# Patient Record
Sex: FEMALE | Race: WHITE | Employment: OTHER | URBAN - METROPOLITAN AREA
[De-identification: names, ages, dates, MRNs, and addresses within clinical notes are randomized per-mention and may not be internally consistent; named-entity substitution may affect disease eponyms.]

---

## 2021-01-29 ENCOUNTER — IMMUNIZATION (OUTPATIENT)
Dept: PRIMARY CARE CLINIC | Age: 86
End: 2021-01-29
Payer: MEDICARE

## 2021-01-29 PROCEDURE — 0011A COVID-19, MODERNA VACCINE 100MCG/0.5ML DOSE: CPT | Performed by: PHYSICIAN ASSISTANT

## 2021-01-29 PROCEDURE — 91301 COVID-19, MODERNA VACCINE 100MCG/0.5ML DOSE: CPT | Performed by: PHYSICIAN ASSISTANT

## 2021-02-26 ENCOUNTER — IMMUNIZATION (OUTPATIENT)
Dept: PRIMARY CARE CLINIC | Age: 86
End: 2021-02-26
Payer: MEDICARE

## 2021-02-26 PROCEDURE — 91301 COVID-19, MODERNA VACCINE 100MCG/0.5ML DOSE: CPT | Performed by: PHYSICIAN ASSISTANT

## 2021-02-26 PROCEDURE — 0012A COVID-19, MODERNA VACCINE 100MCG/0.5ML DOSE: CPT | Performed by: PHYSICIAN ASSISTANT

## 2023-01-25 ENCOUNTER — APPOINTMENT (OUTPATIENT)
Dept: CT IMAGING | Age: 88
End: 2023-01-25
Payer: MEDICARE

## 2023-01-25 ENCOUNTER — APPOINTMENT (OUTPATIENT)
Dept: GENERAL RADIOLOGY | Age: 88
End: 2023-01-25
Payer: MEDICARE

## 2023-01-25 ENCOUNTER — HOSPITAL ENCOUNTER (INPATIENT)
Age: 88
LOS: 1 days | Discharge: OTHER INSTITUTION | End: 2023-01-26
Attending: EMERGENCY MEDICINE | Admitting: FAMILY MEDICINE
Payer: MEDICARE

## 2023-01-25 DIAGNOSIS — I62.9 INTRACRANIAL HEMORRHAGE (HCC): Primary | ICD-10-CM

## 2023-01-25 PROBLEM — I61.9 INTRACEREBRAL HEMORRHAGE, NONTRAUMATIC (HCC): Status: ACTIVE | Noted: 2023-01-25

## 2023-01-25 LAB
ALBUMIN SERPL-MCNC: 3.6 G/DL (ref 3.5–5.2)
ALP BLD-CCNC: 84 U/L (ref 35–104)
ALT SERPL-CCNC: 25 U/L (ref 0–32)
ANION GAP SERPL CALCULATED.3IONS-SCNC: 16 MMOL/L (ref 7–16)
AST SERPL-CCNC: 41 U/L (ref 0–31)
BASOPHILS ABSOLUTE: 0.06 E9/L (ref 0–0.2)
BASOPHILS RELATIVE PERCENT: 0.3 % (ref 0–2)
BILIRUB SERPL-MCNC: 0.5 MG/DL (ref 0–1.2)
BUN BLDV-MCNC: 11 MG/DL (ref 6–23)
CALCIUM SERPL-MCNC: 9 MG/DL (ref 8.6–10.2)
CHLORIDE BLD-SCNC: 99 MMOL/L (ref 98–107)
CO2: 18 MMOL/L (ref 22–29)
CREAT SERPL-MCNC: 0.6 MG/DL (ref 0.5–1)
EOSINOPHILS ABSOLUTE: 0.06 E9/L (ref 0.05–0.5)
EOSINOPHILS RELATIVE PERCENT: 0.3 % (ref 0–6)
GFR SERPL CREATININE-BSD FRML MDRD: >60 ML/MIN/1.73
GLUCOSE BLD-MCNC: 229 MG/DL (ref 74–99)
HCT VFR BLD CALC: 35 % (ref 34–48)
HEMOGLOBIN: 11.4 G/DL (ref 11.5–15.5)
IMMATURE GRANULOCYTES #: 0.09 E9/L
IMMATURE GRANULOCYTES %: 0.5 % (ref 0–5)
INR BLD: 1.3
LYMPHOCYTES ABSOLUTE: 2.49 E9/L (ref 1.5–4)
LYMPHOCYTES RELATIVE PERCENT: 14 % (ref 20–42)
MCH RBC QN AUTO: 29.4 PG (ref 26–35)
MCHC RBC AUTO-ENTMCNC: 32.6 % (ref 32–34.5)
MCV RBC AUTO: 90.2 FL (ref 80–99.9)
METER GLUCOSE: 202 MG/DL (ref 74–99)
MONOCYTES ABSOLUTE: 0.67 E9/L (ref 0.1–0.95)
MONOCYTES RELATIVE PERCENT: 3.8 % (ref 2–12)
NEUTROPHILS ABSOLUTE: 14.44 E9/L (ref 1.8–7.3)
NEUTROPHILS RELATIVE PERCENT: 81.1 % (ref 43–80)
PDW BLD-RTO: 15.1 FL (ref 11.5–15)
PLATELET # BLD: 206 E9/L (ref 130–450)
PMV BLD AUTO: 12.2 FL (ref 7–12)
POTASSIUM SERPL-SCNC: 3.1 MMOL/L (ref 3.5–5)
PROTHROMBIN TIME: 14.2 SEC (ref 9.3–12.4)
RBC # BLD: 3.88 E12/L (ref 3.5–5.5)
SODIUM BLD-SCNC: 133 MMOL/L (ref 132–146)
TOTAL PROTEIN: 6.2 G/DL (ref 6.4–8.3)
TROPONIN, HIGH SENSITIVITY: 40 NG/L (ref 0–9)
WBC # BLD: 17.8 E9/L (ref 4.5–11.5)

## 2023-01-25 PROCEDURE — 6360000004 HC RX CONTRAST MEDICATION: Performed by: RADIOLOGY

## 2023-01-25 PROCEDURE — 85610 PROTHROMBIN TIME: CPT

## 2023-01-25 PROCEDURE — 80053 COMPREHEN METABOLIC PANEL: CPT

## 2023-01-25 PROCEDURE — 99285 EMERGENCY DEPT VISIT HI MDM: CPT

## 2023-01-25 PROCEDURE — 70496 CT ANGIOGRAPHY HEAD: CPT

## 2023-01-25 PROCEDURE — 84484 ASSAY OF TROPONIN QUANT: CPT

## 2023-01-25 PROCEDURE — 71045 X-RAY EXAM CHEST 1 VIEW: CPT

## 2023-01-25 PROCEDURE — 1200000000 HC SEMI PRIVATE

## 2023-01-25 PROCEDURE — 71045 X-RAY EXAM CHEST 1 VIEW: CPT | Performed by: RADIOLOGY

## 2023-01-25 PROCEDURE — 82962 GLUCOSE BLOOD TEST: CPT

## 2023-01-25 PROCEDURE — 70450 CT HEAD/BRAIN W/O DYE: CPT

## 2023-01-25 PROCEDURE — 93005 ELECTROCARDIOGRAM TRACING: CPT | Performed by: EMERGENCY MEDICINE

## 2023-01-25 PROCEDURE — 85025 COMPLETE CBC W/AUTO DIFF WBC: CPT

## 2023-01-25 PROCEDURE — 2580000003 HC RX 258: Performed by: RADIOLOGY

## 2023-01-25 RX ORDER — SODIUM CHLORIDE 0.9 % (FLUSH) 0.9 %
10 SYRINGE (ML) INJECTION PRN
Status: COMPLETED | OUTPATIENT
Start: 2023-01-25 | End: 2023-01-25

## 2023-01-25 RX ADMIN — IOPAMIDOL 100 ML: 755 INJECTION, SOLUTION INTRAVENOUS at 20:45

## 2023-01-25 RX ADMIN — Medication 10 ML: at 20:45

## 2023-01-25 ASSESSMENT — PAIN - FUNCTIONAL ASSESSMENT: PAIN_FUNCTIONAL_ASSESSMENT: NONE - DENIES PAIN

## 2023-01-26 VITALS
BODY MASS INDEX: 20.73 KG/M2 | OXYGEN SATURATION: 93 % | WEIGHT: 105.6 LBS | RESPIRATION RATE: 17 BRPM | HEIGHT: 60 IN | DIASTOLIC BLOOD PRESSURE: 53 MMHG | SYSTOLIC BLOOD PRESSURE: 126 MMHG | TEMPERATURE: 96.4 F

## 2023-01-26 LAB
EKG ATRIAL RATE: 74 BPM
EKG P AXIS: 53 DEGREES
EKG P-R INTERVAL: 146 MS
EKG Q-T INTERVAL: 392 MS
EKG QRS DURATION: 88 MS
EKG QTC CALCULATION (BAZETT): 435 MS
EKG R AXIS: 43 DEGREES
EKG T AXIS: 59 DEGREES
EKG VENTRICULAR RATE: 74 BPM

## 2023-01-26 PROCEDURE — 2500000003 HC RX 250 WO HCPCS: Performed by: FAMILY MEDICINE

## 2023-01-26 PROCEDURE — 93010 ELECTROCARDIOGRAM REPORT: CPT | Performed by: INTERNAL MEDICINE

## 2023-01-26 PROCEDURE — 6360000002 HC RX W HCPCS: Performed by: FAMILY MEDICINE

## 2023-01-26 PROCEDURE — 51702 INSERT TEMP BLADDER CATH: CPT

## 2023-01-26 RX ORDER — GLYCOPYRROLATE 0.2 MG/ML
0.1 INJECTION INTRAMUSCULAR; INTRAVENOUS EVERY 4 HOURS PRN
Status: DISCONTINUED | OUTPATIENT
Start: 2023-01-26 | End: 2023-01-26

## 2023-01-26 RX ORDER — LORAZEPAM 2 MG/ML
0.5 INJECTION INTRAMUSCULAR
Status: DISCONTINUED | OUTPATIENT
Start: 2023-01-26 | End: 2023-01-26 | Stop reason: HOSPADM

## 2023-01-26 RX ORDER — ONDANSETRON 2 MG/ML
4 INJECTION INTRAMUSCULAR; INTRAVENOUS EVERY 6 HOURS PRN
Status: DISCONTINUED | OUTPATIENT
Start: 2023-01-26 | End: 2023-01-26 | Stop reason: HOSPADM

## 2023-01-26 RX ORDER — MORPHINE SULFATE 2 MG/ML
2 INJECTION, SOLUTION INTRAMUSCULAR; INTRAVENOUS
Status: DISCONTINUED | OUTPATIENT
Start: 2023-01-26 | End: 2023-01-26 | Stop reason: HOSPADM

## 2023-01-26 RX ORDER — GLYCOPYRROLATE 0.2 MG/ML
0.1 INJECTION INTRAMUSCULAR; INTRAVENOUS
Status: DISCONTINUED | OUTPATIENT
Start: 2023-01-26 | End: 2023-01-26 | Stop reason: HOSPADM

## 2023-01-26 RX ORDER — GLYCOPYRROLATE 0.2 MG/ML
0.1 INJECTION INTRAMUSCULAR; INTRAVENOUS EVERY 4 HOURS PRN
Status: DISCONTINUED | OUTPATIENT
Start: 2023-01-26 | End: 2023-01-26 | Stop reason: CLARIF

## 2023-01-26 RX ADMIN — GLYCOPYRROLATE 0.1 MG: 0.2 INJECTION, SOLUTION INTRAMUSCULAR; INTRAVENOUS at 11:07

## 2023-01-26 RX ADMIN — MORPHINE SULFATE 2 MG: 2 INJECTION, SOLUTION INTRAMUSCULAR; INTRAVENOUS at 12:27

## 2023-01-26 RX ADMIN — ONDANSETRON 4 MG: 2 INJECTION INTRAMUSCULAR; INTRAVENOUS at 14:35

## 2023-01-26 RX ADMIN — GLYCOPYRROLATE 0.1 MG: 0.2 INJECTION, SOLUTION INTRAMUSCULAR; INTRAVENOUS at 16:34

## 2023-01-26 RX ADMIN — MORPHINE SULFATE 2 MG: 2 INJECTION, SOLUTION INTRAMUSCULAR; INTRAVENOUS at 16:35

## 2023-01-26 RX ADMIN — GLYCOPYRROLATE 0.1 MG: 0.2 INJECTION, SOLUTION INTRAMUSCULAR; INTRAVENOUS at 14:57

## 2023-01-26 RX ADMIN — MORPHINE SULFATE 2 MG: 2 INJECTION, SOLUTION INTRAMUSCULAR; INTRAVENOUS at 14:35

## 2023-01-26 ASSESSMENT — LIFESTYLE VARIABLES: HOW OFTEN DO YOU HAVE A DRINK CONTAINING ALCOHOL: NEVER

## 2023-01-26 NOTE — PROGRESS NOTES
At patient bedside patient had not breath sounds or Heart rate. Two person heart rate and breath sound check. Patient has no heart rate or pulse and no respirations. Cat RN and Emilia Rileyre.

## 2023-01-26 NOTE — DISCHARGE SUMMARY
Discharge Summary    Date: 2023  Patient Name: Lorin Wu    YOB: 1935     Age: 80 y.o. Admit Date: 2023  Discharge Date: 2023  Discharge Condition:    Admission Diagnosis  Intracranial hemorrhage ([address] [I62.9]; Intracerebral hemorrhage, nontraumatic (HCC) [I61.9]      Discharge Diagnosis  Principal Problem:    Intracerebral hemorrhage, nontraumatic (HCC)  Resolved Problems:    * No resolved hospital problems. [facility] AND [facility] Stay  Narrative of Hospital Course:  Patient admitted with AMS  Found to have large CVA with midline shift  DNR-CC per   Patient passed    Consultants:  IP CONSULT TO INTERNAL MEDICINE  IP CONSULT TO HOSPICE    Surgeries/procedures Performed:      Treatments:            Discharge Plan/Disposition:      Hospital/Incidental Findings Requiring Follow Up:    Patient Instructions:    Diet:    Activity:  For number of days (if applicable): Other Instructions:    Provider Follow-Up:   No follow-ups on file.      Significant Diagnostic Studies:    Recent Labs:  Admission on 2023  WBC                                           Date: 2023  Value: 17.8 (A)    Ref range: 4.5 - 11.5 E9/L    Status: Final  RBC                                           Date: 2023  Value: 3.88        Ref range: 3.50 - 5.50 E12/L  Status: Final  Hemoglobin                                    Date: 2023  Value: 11.4 (A)    Ref range: 11.5 - 15.5 g/dL   Status: Final  Hematocrit                                    Date: 2023  Value: 35.0        Ref range: 34.0 - 48.0 %      Status: Final  MCV                                           Date: 2023  Value: 90.2        Ref range: 80.0 - 99.9 fL     Status: Final  MCH                                           Date: 2023  Value: 29.4        Ref range: 26.0 - 35.0 pg     Status: Final  MCHC                                          Date: 2023  Value: 32.6        Ref range: 32.0 - 34.5 % Status: Final  RDW                                           Date: 01/25/2023  Value: 15.1 (A)    Ref range: 11.5 - 15.0 fL     Status: Final  Platelets                                     Date: 01/25/2023  Value: 206         Ref range: 130 - 450 E9/L     Status: Final  MPV                                           Date: 01/25/2023  Value: 12.2 (A)    Ref range: 7.0 - 12.0 fL      Status: Final  Neutrophils %                                 Date: 01/25/2023  Value: 81.1 (A)    Ref range: 43.0 - 80.0 %      Status: Final  Immature Granulocytes %                       Date: 01/25/2023  Value: 0.5         Ref range: 0.0 - 5.0 %        Status: Final  Lymphocytes %                                 Date: 01/25/2023  Value: 14.0 (A)    Ref range: 20.0 - 42.0 %      Status: Final  Monocytes %                                   Date: 01/25/2023  Value: 3.8         Ref range: 2.0 - 12.0 %       Status: Final  Eosinophils %                                 Date: 01/25/2023  Value: 0.3         Ref range: 0.0 - 6.0 %        Status: Final  Basophils %                                   Date: 01/25/2023  Value: 0.3         Ref range: 0.0 - 2.0 %        Status: Final  Neutrophils Absolute                          Date: 01/25/2023  Value: 14.44 (A)   Ref range: 1.80 - 7.30 E9/L   Status: Final  Immature Granulocytes #                       Date: 01/25/2023  Value: 0.09        Ref range: E9/L               Status: Final  Lymphocytes Absolute                          Date: 01/25/2023  Value: 2.49        Ref range: 1.50 - 4.00 E9/L   Status: Final  Monocytes Absolute                            Date: 01/25/2023  Value: 0.67        Ref range: 0.10 - 0.95 E9/L   Status: Final  Eosinophils Absolute                          Date: 01/25/2023  Value: 0.06        Ref range: 0.05 - 0.50 E9/L   Status: Final  Basophils Absolute                            Date: 01/25/2023  Value: 0.06        Ref range: 0.00 - 0.20 E9/L   Status: Final  Sodium Date: 01/25/2023  Value: 133         Ref range: 132 - 146 mmol/L   Status: Final  Potassium                                     Date: 01/25/2023  Value: 3.1 (A)     Ref range: 3.5 - 5.0 mmol/L   Status: Final  Chloride                                      Date: 01/25/2023  Value: 99          Ref range: 98 - 107 mmol/L    Status: Final  CO2                                           Date: 01/25/2023  Value: 18 (A)      Ref range: 22 - 29 mmol/L     Status: Final  Anion Gap                                     Date: 01/25/2023  Value: 16          Ref range: 7 - 16 mmol/L      Status: Final  Glucose                                       Date: 01/25/2023  Value: 229 (A)     Ref range: 74 - 99 mg/dL      Status: Final  BUN                                           Date: 01/25/2023  Value: 11          Ref range: 6 - 23 mg/dL       Status: Final  Creatinine                                    Date: 01/25/2023  Value: 0.6         Ref range: 0.5 - 1.0 mg/dL    Status: Final  Est, Glom Filt Rate                           Date: 01/25/2023  Value: >60         Ref range: >=60 mL/min/1.73   Status: Final                Comment: Pediatric calculator link  [place].at. org/professionals/kdoqi/gfr_calculatorped  Effective Oct 3, 2022  These results are not intended for use in patients  <25years of age. eGFR results are calculated without  a race factor using the 2021 CKD-EPI equation. Careful  clinical correlation is recommended, particularly when  comparing to results calculated using previous equations. The CKD-EPI equation is less accurate in patients with  extremes of muscle mass, extra-renal metabolism of  creatinine, excessive creatinine ingestion, or following  therapy that affects renal tubular secretion.     Calcium                                       Date: 01/25/2023  Value: 9.0         Ref range: 8.6 - 10.2 mg/dL   Status: Final  Total Protein                                 Date: 01/25/2023  Value: 6.2 (A)     Ref range: 6.4 - 8.3 g/dL     Status: Final  Albumin                                       Date: 01/25/2023  Value: 3.6         Ref range: 3.5 - 5.2 g/dL     Status: Final  Total Bilirubin                               Date: 01/25/2023  Value: 0.5         Ref range: 0.0 - 1.2 mg/dL    Status: Final  Alkaline Phosphatase                          Date: 01/25/2023  Value: 84          Ref range: 35 - 104 U/L       Status: Final  ALT                                           Date: 01/25/2023  Value: 25          Ref range: 0 - 32 U/L         Status: Final  AST                                           Date: 01/25/2023  Value: 41 (A)      Ref range: 0 - 31 U/L         Status: Final  Troponin, High Sensitivity                    Date: 01/25/2023  Value: 40 (A)      Ref range: 0 - 9 ng/L         Status: Final                Comment: High Sensitivity Troponin values cannot be compared with  other Troponin methodologies. Patients with high levels of Biotin oral intake (i.e. >5 mg/day)  may have falsely decreased Troponin levels. Samples collected  within 8 hours of biotin intake may require additional information  for diagnosis.     Ventricular Rate                              Date: 01/25/2023  Value: 74          Ref range: BPM                Status: Final  Atrial Rate                                   Date: 01/25/2023  Value: 74          Ref range: BPM                Status: Final  P-R Interval                                  Date: 01/25/2023  Value: 146         Ref range: ms                 Status: Final  QRS Duration                                  Date: 01/25/2023  Value: 88          Ref range: ms                 Status: Final  Q-T Interval                                  Date: 01/25/2023  Value: 392         Ref range: ms                 Status: Final  QTc Calculation (Bazett)                      Date: 01/25/2023  Value: 435         Ref range: ms                 Status: Final  P Axis Date: 01/25/2023  Value: 53          Ref range: degrees            Status: Final  R Axis                                        Date: 01/25/2023  Value: 43          Ref range: degrees            Status: Final  T Axis                                        Date: 01/25/2023  Value: 59          Ref range: degrees            Status: Final  Protime                                       Date: 01/25/2023  Value: 14.2 (A)    Ref range: 9.3 - 12.4 sec     Status: Final  INR                                           Date: 01/25/2023  Value: 1.3           Status: Final  Meter Glucose                                 Date: 01/25/2023  Value: 202 (A)     Ref range: 74 - 99 mg/dL      Status: Final  ------------    Radiology last 7 days:  CT HEAD WO CONTRAST    Result Date: 1/25/2023  CT Head: Multicompartmental hemorrhages as described above, including large right frontal intraparenchymal hematoma. 2.2 cm of right to left midline shift. Right to left uncal herniation. Mild dilatation of the atrium and temporal horn of the left lateral ventricle, compatible with early changes of entrapment. CTA Neck: No hemodynamically significant stenosis or dissection of the cervical internal carotid arteries. Irregular contour of the bilateral cervical ICA is in a region of artifact, and therefore may be artifactual however fibromuscular dysplasia is not excluded. No high-grade stenosis or dissection of the cervical vertebral arteries. CTA Head: No large vessel arterial occlusion or high-grade stenosis. Nonemergent incidental findings as above. Critical results were called by Dr. Radha Castro MD to Dr. Lev Dubose on 1/25/2023 at 9:33 p.m. [place] time. XR CHEST PORTABLE    Result Date: 1/25/2023  Bilateral lung hyperinflation. No acute process.      CTA HEAD W CONTRAST    Result Date: 1/25/2023  CT Head: Multicompartmental hemorrhages as described above, including large right frontal intraparenchymal hematoma. 2.2 cm of right to left midline shift. Right to left uncal herniation. Mild dilatation of the atrium and temporal horn of the left lateral ventricle, compatible with early changes of entrapment. CTA Neck: No hemodynamically significant stenosis or dissection of the cervical internal carotid arteries. Irregular contour of the bilateral cervical ICA is in a region of artifact, and therefore may be artifactual however fibromuscular dysplasia is not excluded. No high-grade stenosis or dissection of the cervical vertebral arteries. CTA Head: No large vessel arterial occlusion or high-grade stenosis. Nonemergent incidental findings as above. Critical results were called by Dr. Denilson Gonzalez MD to Dr. Snow Ramesh on 1/25/2023 at 9:33 p.m. [place] time. [unfilled]    Discharge Medications    There are no discharge medications for this patient. There are no discharge medications for this patient. There are no discharge medications for this patient. There are no discharge medications for this patient. Time Spent on Discharge:  15 minutes were spent in patient examination, evaluation, counseling as well as medication reconciliation, prescriptions for required medications, discharge plan, and follow up.     Electronically signed by Jeffrey Valdes MD on 1/26/23 at 6:05 PM EST

## 2023-01-26 NOTE — CARE COORDINATION
1/26/2023 social work transition of care planning  Hospice consult noted on chart. Sw spoke with pt's spouse at [address] provided. [facility] chosen,referral made.   Electronically signed by SALLIE Rodriguez on 1/26/2023 at 8:19 AM

## 2023-01-26 NOTE — ED NOTES
Returned to room. Family at bedside. Dr. Nadine Pope discussing CT results/POC.      Olegario Conway, RN  01/25/23 1900

## 2023-01-26 NOTE — ED PROVIDER NOTES
HPI:  1/25/23, Time: 8:41 PM EST Rudy Oppenheim is a 80 y.o. female presenting to the ED for altered mental status, beginning since 725 pmago. The complaint has been persistent, severe in severity, and worsened by nothing. Patient was brought in by EMS because of unresponsiveness patient reportedly was having strokelike symptoms at home. Patient unable to give history    ROS:   Pertinent positives and negatives are stated within HPI, all other systems reviewed and are negative.  --------------------------------------------- PAST HISTORY ---------------------------------------------  Past Medical History:  has no past medical history on file. Past Surgical History:  has no past surgical history on file. Social History:      Family History: family history is not on file. The patients home medications have been reviewed. Allergies: Patient has no known allergies. ---------------------------------------------------PHYSICAL EXAM--------------------------------------    Constitutional/General: Unresponsive  Head: Normocephalic and atraumatic  Eyes: Right pupil is about 3 to 4 mm nonreactive left pupil 2 mm nonreactive  Mouth: Oropharynx no gag  Neck: Supple, full ROM, non tender to palpation in the midline, no stridor, no crepitus, no meningeal signs  Pulmonary: Lungs diminished bilateral  Cardiovascular:  Regular rate. Regular rhythm. No murmurs, gallops, or rubs. 2+ distal pulses  Chest: no chest wall tenderness  Abdomen: Soft. Non tender. Non distended. +BS. No rebound, guarding, or rigidity. No pulsatile masses appreciated. Musculoskeletal: Patient does withdraw to pain upper extremity  Skin: warm and dry. No rashes. Neurologic: Patient unresponsive.   Patient nonverbal.  Patient withdraws the pain    -------------------------------------------------- RESULTS -------------------------------------------------  I have personally reviewed all laboratory and imaging results for this patient. Results are listed below.      LABS:  Results for orders placed or performed during the hospital encounter of 01/25/23   CBC with Auto Differential   Result Value Ref Range    WBC 17.8 (H) 4.5 - 11.5 E9/L    RBC 3.88 3.50 - 5.50 E12/L    Hemoglobin 11.4 (L) 11.5 - 15.5 g/dL    Hematocrit 35.0 34.0 - 48.0 %    MCV 90.2 80.0 - 99.9 fL    MCH 29.4 26.0 - 35.0 pg    MCHC 32.6 32.0 - 34.5 %    RDW 15.1 (H) 11.5 - 15.0 fL    Platelets 659 312 - 631 E9/L    MPV 12.2 (H) 7.0 - 12.0 fL    Neutrophils % 81.1 (H) 43.0 - 80.0 %    Immature Granulocytes % 0.5 0.0 - 5.0 %    Lymphocytes % 14.0 (L) 20.0 - 42.0 %    Monocytes % 3.8 2.0 - 12.0 %    Eosinophils % 0.3 0.0 - 6.0 %    Basophils % 0.3 0.0 - 2.0 %    Neutrophils Absolute 14.44 (H) 1.80 - 7.30 E9/L    Immature Granulocytes # 0.09 E9/L    Lymphocytes Absolute 2.49 1.50 - 4.00 E9/L    Monocytes Absolute 0.67 0.10 - 0.95 E9/L    Eosinophils Absolute 0.06 0.05 - 0.50 E9/L    Basophils Absolute 0.06 0.00 - 0.20 E9/L   Comprehensive Metabolic Panel   Result Value Ref Range    Sodium 133 132 - 146 mmol/L    Potassium 3.1 (L) 3.5 - 5.0 mmol/L    Chloride 99 98 - 107 mmol/L    CO2 18 (L) 22 - 29 mmol/L    Anion Gap 16 7 - 16 mmol/L    Glucose 229 (H) 74 - 99 mg/dL    BUN 11 6 - 23 mg/dL    Creatinine 0.6 0.5 - 1.0 mg/dL    Est, Glom Filt Rate >60 >=60 mL/min/1.73    Calcium 9.0 8.6 - 10.2 mg/dL    Total Protein 6.2 (L) 6.4 - 8.3 g/dL    Albumin 3.6 3.5 - 5.2 g/dL    Total Bilirubin 0.5 0.0 - 1.2 mg/dL    Alkaline Phosphatase 84 35 - 104 U/L    ALT 25 0 - 32 U/L    AST 41 (H) 0 - 31 U/L   Troponin   Result Value Ref Range    Troponin, High Sensitivity 40 (H) 0 - 9 ng/L   Protime-INR   Result Value Ref Range    Protime 14.2 (H) 9.3 - 12.4 sec    INR 1.3    POCT Glucose   Result Value Ref Range    Meter Glucose 202 (H) 74 - 99 mg/dL   EKG 12 Lead   Result Value Ref Range    Ventricular Rate 74 BPM    Atrial Rate 74 BPM    P-R Interval 146 ms    QRS Duration 88 ms    Q-T Interval 392 ms    QTc Calculation (Bazett) 435 ms    P Axis 53 degrees    R Axis 43 degrees    T Axis 59 degrees       RADIOLOGY:  Interpreted by Radiologist.  XR CHEST PORTABLE   Final Result   Bilateral lung hyperinflation. No acute process. CT HEAD WO CONTRAST   Final Result   CT Head:      Multicompartmental hemorrhages as described above, including large right   frontal intraparenchymal hematoma. 2.2 cm of right to left midline shift. Right to left uncal herniation. Mild dilatation of the atrium and temporal horn of the left lateral   ventricle, compatible with early changes of entrapment. CTA Neck:      No hemodynamically significant stenosis or dissection of the cervical   internal carotid arteries. Irregular contour of the bilateral cervical ICA   is in a region of artifact, and therefore may be artifactual however   fibromuscular dysplasia is not excluded. No high-grade stenosis or dissection of the cervical vertebral arteries. CTA Head:      No large vessel arterial occlusion or high-grade stenosis. Nonemergent incidental findings as above. Critical results were called by Dr. Sunshine Cullen MD to Dr. Julia Le on   1/25/2023 at 9:33 p.m. [place] time. CTA HEAD W CONTRAST   Final Result   CT Head:      Multicompartmental hemorrhages as described above, including large right   frontal intraparenchymal hematoma. 2.2 cm of right to left midline shift. Right to left uncal herniation. Mild dilatation of the atrium and temporal horn of the left lateral   ventricle, compatible with early changes of entrapment. CTA Neck:      No hemodynamically significant stenosis or dissection of the cervical   internal carotid arteries. Irregular contour of the bilateral cervical ICA   is in a region of artifact, and therefore may be artifactual however   fibromuscular dysplasia is not excluded.       No high-grade stenosis or dissection of the cervical vertebral arteries. CTA Head:      No large vessel arterial occlusion or high-grade stenosis. Nonemergent incidental findings as above. Critical results were called by Dr. Debo Garcia MD to Dr. Lexi Kay on   1/25/2023 at 9:33 p.m. [facility] time. EKG:  This EKG is signed and interpreted by me. Rate: 74  Rhythm: Sinus  Interpretation: non-specific EKG  Comparison: no previous EKG available        ------------------------- NURSING NOTES AND VITALS REVIEWED ---------------------------   The nursing notes within the ED encounter and vital signs as below have been reviewed by myself. BP (!) 191/87   Pulse 71   Temp 97.6 °F (36.4 °C) (Axillary)   Resp 22   Ht 5' (1.524 m)   Wt 100 lb (45.4 kg)   LMP  (Approximate)   SpO2 98%   BMI 19.53 kg/m²   Oxygen Saturation Interpretation: Normal    The patients available past medical records and past encounters were reviewed. ------------------------------ ED COURSE/MEDICAL DECISION MAKING----------------------  Medications   iopamidol (ISOVUE-370) 76 % injection 100 mL (100 mLs IntraVENous Given 1/25/23 2045)   sodium chloride flush 0.9 % injection 10 mL (10 mLs IntraVENous Given 1/25/23 2045)             Medical Decision Making:      History From: Patient presenting here from home because of unresponsiveness. Patient unable to give history. Patient was last known well around 7:00. Per family patient was complaining of headache. Patient unable to give history here. CC/HPI Summary, DDx, ED Course, Reassessment, Tests Considered, Patient expectation:   Patient presenting here history of hypothyroidism as well as history of hyperlipidemia presenting here because of altered mental status from home. Patient was last known well in around 7 PM.  Patient arrived here she was not responding. Patient heart exam normal lungs are clear abdomen soft patient has no upper or lower extremity movement she does withdraw to pain.   Patient was made a stroke alert. Patient differential includes ischemic stroke as well as hemorrhagic stroke as well as subdural hematoma as well as metabolic etiology for her altered mental status. .  Patient had CTs done showed extensive hemorrhage with shift as well as uncal herniation. Patient remained unresponsive here I spoke to family extensively and spoke to  as well as children at bedside  She would not want to be placed on a ventilator or have CPR done they to be comfortable. They are aware that she is critical and may pass away they understand this and are comfortable with current plan. Patient will be admitted to general floor I did speak to Dr. Yonny Chambers who is on-call and he was made aware of patient's condition as well as family's request that they do not want any heroic measures performed on her. Social Determinants affecting Dx or Tx: Patient does not smoke    Chronic Conditions: Patient has history of hypothyroidism high cholesterol patient is on iron tablet    Records Reviewed: none        Re-Evaluations:             Re-evaluation. Patients symptoms show no change      Consultations:             Dr Magallanes Service:   Please note that the withdrawal or failure to initiate urgent interventions for this patient would likely result in a life threatening deterioration or permanent disability. Accordingly this patient received 30 minutes of critical care time, excluding separately billable procedures. This patient's ED course included: a personal history and physicial eaxmination    This patient has been closely monitored during their ED course. Counseling: The emergency provider has spoken with the family and discussed todays results, in addition to providing specific details for the plan of care and counseling regarding the diagnosis and prognosis.   Questions are answered at this time and they are agreeable with the plan.       --------------------------------- IMPRESSION AND DISPOSITION ---------------------------------    IMPRESSION  1. Intracranial hemorrhage ([address]        DISPOSITION  Disposition: Admit to med/surg floor  Patient condition is poor        NOTE: This report was transcribed using voice recognition software.  Every effort was made to ensure accuracy; however, inadvertent computerized transcription errors may be present         Roberto Dominguez MD  01/25/23 [address] Ivette Stewart MD  01/25/23 [id number]

## 2023-01-26 NOTE — PROGRESS NOTES
Patsy peterson sent to  about patient passing. Also spoke with jami from [facility] office and she said she shouldn't be a [facility] case okay to release to  home. Nursing supervisor Kalyani Quick notified.

## 2023-01-26 NOTE — H&P
HISTORY AND PHYSICAL             Date: 1/26/2023        Patient Name: Eileen Pisano     YOB: 1935      Age:  80 y.o. Chief Complaint     Chief Complaint   Patient presents with    Cerebrovascular Accident     Family reports [address]. PT become nonverbal with upward gaze        History Obtained From   family member -     History of Present Illness   Patient brought to ER for AMS and unresponsiveness. Past Medical History   History reviewed. No pertinent past medical history. Past Surgical History   No past surgical history on file. Medications Prior to Admission     Prior to Admission medications    Not on File        Allergies   Patient has no known allergies. Social History     Social History       Tobacco History       Smoking Status  Unknown      Smokeless Tobacco Use  Unknown              Alcohol History       Alcohol Use Status  Not Asked              Drug Use       Drug Use Status  Not Asked              Sexual Activity       Sexually Active  Not Asked                    Family History   No family history on file. Review of Systems   Review of Systems   Unable to perform ROS: Patient unresponsive     Physical Exam   BP (!) 156/57   Pulse (!) 48   Temp 97.4 °F (36.3 °C) (Temporal)   Resp 12   Ht 5' (1.524 m)   Wt 105 lb 9.6 oz (47.9 kg)   LMP  (Approximate)   SpO2 99%   BMI 20.62 kg/m²     Physical Exam  Vitals reviewed. Cardiovascular:      Rate and Rhythm: Normal rate and regular rhythm. Pulses: Normal pulses. Heart sounds: Normal heart sounds. Pulmonary:      Effort: Pulmonary effort is normal. No respiratory distress. Breath sounds: Normal breath sounds. No wheezing.    Neurological:      Comments: unresponsive       Labs      Recent Results (from the past 24 hour(s))   POCT Glucose    Collection Time: 01/25/23  8:39 PM   Result Value Ref Range    Meter Glucose 202 (H) 74 - 99 mg/dL   CBC with Auto Differential    Collection Time: 01/25/23  9:09 PM   Result Value Ref Range    WBC 17.8 (H) 4.5 - 11.5 E9/L    RBC 3.88 3.50 - 5.50 E12/L    Hemoglobin 11.4 (L) 11.5 - 15.5 g/dL    Hematocrit 35.0 34.0 - 48.0 %    MCV 90.2 80.0 - 99.9 fL    MCH 29.4 26.0 - 35.0 pg    MCHC 32.6 32.0 - 34.5 %    RDW 15.1 (H) 11.5 - 15.0 fL    Platelets 232 817 - 419 E9/L    MPV 12.2 (H) 7.0 - 12.0 fL    Neutrophils % 81.1 (H) 43.0 - 80.0 %    Immature Granulocytes % 0.5 0.0 - 5.0 %    Lymphocytes % 14.0 (L) 20.0 - 42.0 %    Monocytes % 3.8 2.0 - 12.0 %    Eosinophils % 0.3 0.0 - 6.0 %    Basophils % 0.3 0.0 - 2.0 %    Neutrophils Absolute 14.44 (H) 1.80 - 7.30 E9/L    Immature Granulocytes # 0.09 E9/L    Lymphocytes Absolute 2.49 1.50 - 4.00 E9/L    Monocytes Absolute 0.67 0.10 - 0.95 E9/L    Eosinophils Absolute 0.06 0.05 - 0.50 E9/L    Basophils Absolute 0.06 0.00 - 0.20 E9/L   Comprehensive Metabolic Panel    Collection Time: 01/25/23  9:09 PM   Result Value Ref Range    Sodium 133 132 - 146 mmol/L    Potassium 3.1 (L) 3.5 - 5.0 mmol/L    Chloride 99 98 - 107 mmol/L    CO2 18 (L) 22 - 29 mmol/L    Anion Gap 16 7 - 16 mmol/L    Glucose 229 (H) 74 - 99 mg/dL    BUN 11 6 - 23 mg/dL    Creatinine 0.6 0.5 - 1.0 mg/dL    Est, Glom Filt Rate >60 >=60 mL/min/1.73    Calcium 9.0 8.6 - 10.2 mg/dL    Total Protein 6.2 (L) 6.4 - 8.3 g/dL    Albumin 3.6 3.5 - 5.2 g/dL    Total Bilirubin 0.5 0.0 - 1.2 mg/dL    Alkaline Phosphatase 84 35 - 104 U/L    ALT 25 0 - 32 U/L    AST 41 (H) 0 - 31 U/L   Troponin    Collection Time: 01/25/23  9:09 PM   Result Value Ref Range    Troponin, High Sensitivity 40 (H) 0 - 9 ng/L   Protime-INR    Collection Time: 01/25/23  9:09 PM   Result Value Ref Range    Protime 14.2 (H) 9.3 - 12.4 sec    INR 1.3    EKG 12 Lead    Collection Time: 01/25/23  9:18 PM   Result Value Ref Range    Ventricular Rate 74 BPM    Atrial Rate 74 BPM    P-R Interval 146 ms    QRS Duration 88 ms    Q-T Interval 392 ms    QTc Calculation (Bazett) 435 ms    P Axis 53 degrees R Axis 43 degrees    T Axis 59 degrees        Imaging/Diagnostics Last 24 Hours   CT HEAD WO CONTRAST    Result Date: 1/25/2023  EXAMINATION: CTA OF THE HEAD WITH CONTRAST; CT OF THE HEAD WITHOUT CONTRAST 1/25/2023 5:44 pm: TECHNIQUE: CTA of the head/brain was performed with the administration of intravenous contrast. Multiplanar reformatted images are provided for review. MIP images are provided for review. Automated exposure control, iterative reconstruction, and/or weight based adjustment of the mA/kV was utilized to reduce the radiation dose to as low as reasonably achievable.; CT of the head was performed without the administration of intravenous contrast. Automated exposure control, iterative reconstruction, and/or weight based adjustment of the mA/kV was utilized to reduce the radiation dose to as low as reasonably achievable. COMPARISON: None. HISTORY: ORDERING SYSTEM PROVIDED HISTORY: [facility] stroke TECHNOLOGIST PROVIDED HISTORY: Reason for exam:->ams stroke Has a \"code stroke\" or \"stroke alert\" been called? ->Yes Decision Support Exception - unselect if not a suspected or confirmed emergency medical condition->Emergency Medical Condition (MA) What reading provider will be dictating this exam?->CRC FINDINGS: CT HEAD: Massive right frontal parenchymal hematoma, difficult to differentiate from extra-axial hemorrhage in the right frontal region. In rough measurements, it may be 9 cm x 5 cm x 6 cm (series 306, image 28 and series 303, image 20). Right holo hemispheric subdural hematoma of mixed density is up to 1 cm in thickness overlying the mid right frontal lobe on coronal images (series 307, image 63). There is extra-axial hemorrhage in the cerebral interhemispheric fissure. A small amount subarachnoid hemorrhage in the right frontal lobe is likely present. The aforementioned hemorrhages result in right to left midline shift of 2.2 cm and right to left uncal herniation.  The atrium and temporal horn of the left lateral ventricle are mildly dilated. The right lateral ventricle and 3rd ventricle are obliterated. No acute calvarial fracture. CTA NECK: Aortic arch: Three-vessel aortic arch. Soft and calcified plaque. Common carotids: The common carotid arteries are normal in course and caliber. Internal carotids: The internal carotid arteries demonstrate no hemodynamically significant stenosis or evidence of acute dissection. Irregular contour of the left greater than right cervical ICAs (series 601, image 50) is in a region of streak/beam hardening artifact from dental amalgam. Vertebrals: The cervical vertebral arteries demonstrate no high-grade stenosis, occlusion, or acute dissection. Calcified plaque at the origin of the right vertebral artery. Biapical pleural-parenchymal scarring. Prominent but subcentimeter short axis left level 1 B lymph node (series 306, image 112). Prominent but subcentimeter short axis right intraparotid lymph node versus parotid neoplasm (series 306, image 132). Small amount of debris/mucous in the trachea and in the oropharynx/nasopharynx. Degenerative changes of the spine. Torus palatini. CTA HEAD: ANTERIOR CIRCULATION: Intracranial ICA: No high-grade stenosis or occlusion of the intracranial internal carotid arteries. MCA: No flow-limiting stenosis. Vessels are displaced to the left and posteriorly. NU: No flow-limiting stenosis. Vessels are displaced to the left. POSTERIOR CIRCULATION: Intradural vertebral arteries: No flow-limiting stenosis. Basilar artery: No flow-limiting stenosis. PCA: No flow-limiting stenosis. Dural sinuses: The major dural venus sinuses are grossly patent. CT Head: Multicompartmental hemorrhages as described above, including large right frontal intraparenchymal hematoma. 2.2 cm of right to left midline shift. Right to left uncal herniation.  Mild dilatation of the atrium and temporal horn of the left lateral ventricle, compatible with early changes of entrapment. CTA Neck: No hemodynamically significant stenosis or dissection of the cervical internal carotid arteries. Irregular contour of the bilateral cervical ICA is in a region of artifact, and therefore may be artifactual however fibromuscular dysplasia is not excluded. No high-grade stenosis or dissection of the cervical vertebral arteries. CTA Head: No large vessel arterial occlusion or high-grade stenosis. Nonemergent incidental findings as above. Critical results were called by Dr. Guillermo Wu MD to Dr. Kirt Lopez on 1/25/2023 at 9:33 p.m. [place] time. XR CHEST PORTABLE    Result Date: 1/25/2023  EXAMINATION: ONE XRAY VIEW OF THE CHEST 1/25/2023 8:40 pm COMPARISON: None. HISTORY: ORDERING SYSTEM PROVIDED HISTORY: [facility] TECHNOLOGIST PROVIDED HISTORY: Reason for exam:->ams What reading provider will be dictating this exam?->CRC FINDINGS: Lines, tubes, and devices: None. Lungs and pleura: There is bilateral lung hyperinflation which may have association with COPD/emphysema. There is biapical pleural thickening. No focal consolidation. No pleural effusion. No pneumothorax. Cardiomediastinal silhouette: The heart size is upper limits of normal. There is atherosclerotic, ectatic thoracic aorta. Bones and soft tissues: The bones are osteopenic. Bilateral lung hyperinflation. No acute process. CTA HEAD W CONTRAST    Result Date: 1/25/2023  EXAMINATION: CTA OF THE HEAD WITH CONTRAST; CT OF THE HEAD WITHOUT CONTRAST 1/25/2023 5:44 pm: TECHNIQUE: CTA of the head/brain was performed with the administration of intravenous contrast. Multiplanar reformatted images are provided for review. MIP images are provided for review.  Automated exposure control, iterative reconstruction, and/or weight based adjustment of the mA/kV was utilized to reduce the radiation dose to as low as reasonably achievable.; CT of the head was performed without the administration of intravenous contrast. Automated exposure control, iterative reconstruction, and/or weight based adjustment of the mA/kV was utilized to reduce the radiation dose to as low as reasonably achievable. COMPARISON: None. HISTORY: ORDERING SYSTEM PROVIDED HISTORY: [facility] stroke TECHNOLOGIST PROVIDED HISTORY: Reason for exam:->ams stroke Has a \"code stroke\" or \"stroke alert\" been called? ->Yes Decision Support Exception - unselect if not a suspected or confirmed emergency medical condition->Emergency Medical Condition (MA) What reading provider will be dictating this exam?->CRC FINDINGS: CT HEAD: Massive right frontal parenchymal hematoma, difficult to differentiate from extra-axial hemorrhage in the right frontal region. In rough measurements, it may be 9 cm x 5 cm x 6 cm (series 306, image 28 and series 303, image 20). Right holo hemispheric subdural hematoma of mixed density is up to 1 cm in thickness overlying the mid right frontal lobe on coronal images (series 307, image 63). There is extra-axial hemorrhage in the cerebral interhemispheric fissure. A small amount subarachnoid hemorrhage in the right frontal lobe is likely present. The aforementioned hemorrhages result in right to left midline shift of 2.2 cm and right to left uncal herniation. The atrium and temporal horn of the left lateral ventricle are mildly dilated. The right lateral ventricle and 3rd ventricle are obliterated. No acute calvarial fracture. CTA NECK: Aortic arch: Three-vessel aortic arch. Soft and calcified plaque. Common carotids: The common carotid arteries are normal in course and caliber. Internal carotids: The internal carotid arteries demonstrate no hemodynamically significant stenosis or evidence of acute dissection. Irregular contour of the left greater than right cervical ICAs (series 601, image 50) is in a region of streak/beam hardening artifact from dental amalgam. Vertebrals:  The cervical vertebral arteries demonstrate no high-grade stenosis, occlusion, or acute dissection. Calcified plaque at the origin of the right vertebral artery. Biapical pleural-parenchymal scarring. Prominent but subcentimeter short axis left level 1 B lymph node (series 306, image 112). Prominent but subcentimeter short axis right intraparotid lymph node versus parotid neoplasm (series 306, image 132). Small amount of debris/mucous in the trachea and in the oropharynx/nasopharynx. Degenerative changes of the spine. Torus palatini. CTA HEAD: ANTERIOR CIRCULATION: Intracranial ICA: No high-grade stenosis or occlusion of the intracranial internal carotid arteries. MCA: No flow-limiting stenosis. Vessels are displaced to the left and posteriorly. NU: No flow-limiting stenosis. Vessels are displaced to the left. POSTERIOR CIRCULATION: Intradural vertebral arteries: No flow-limiting stenosis. Basilar artery: No flow-limiting stenosis. PCA: No flow-limiting stenosis. Dural sinuses: The major dural venus sinuses are grossly patent. CT Head: Multicompartmental hemorrhages as described above, including large right frontal intraparenchymal hematoma. 2.2 cm of right to left midline shift. Right to left uncal herniation. Mild dilatation of the atrium and temporal horn of the left lateral ventricle, compatible with early changes of entrapment. CTA Neck: No hemodynamically significant stenosis or dissection of the cervical internal carotid arteries. Irregular contour of the bilateral cervical ICA is in a region of artifact, and therefore may be artifactual however fibromuscular dysplasia is not excluded. No high-grade stenosis or dissection of the cervical vertebral arteries. CTA Head: No large vessel arterial occlusion or high-grade stenosis. Nonemergent incidental findings as above. Critical results were called by Dr. Ayala Floyd MD to Dr. Aubrie Mesa on 1/25/2023 at 9:33 p.m. [place] time.        Assessment      Hospital Problems             Last Modified POA    * (Principal) Intracerebral hemorrhage, nontraumatic ([facility] [address] 1/25/2023 Yes       Plan   Spoke with  at bedside in detail. He is in agreement with [facility] and  hospice consult.     Consultations Ordered:  IP CONSULT TO INTERNAL MEDICINE  IP CONSULT TO HOSPICE    Electronically signed by Caleb Lui MD on 1/26/23 at 6:58 AM EST

## 2023-01-26 NOTE — ED NOTES
Time Neurologist page:      Time Stroke Alert called:20:38  :    Time Neurologist called back:    X-Ray/CT notified: 20:38     Dorothy Severe  01/25/23 2040

## 2023-01-26 NOTE — PROGRESS NOTES
Consult received and chart reviewed. Visit to bedside made. , daughters and son in law present at the bedside. Hospice philosophy and services discussed. All questions answered. Pt displayed labored breathing with RR 25. Asked bedside nurse to medicate for comfort. Family would like some time to consider the information and discuss how they would like to proceed. List of nursing facilities in [facility] given. [facility] will follow up. CM updated.      Electronically signed by Bernardo Wren RN on 1/26/2023 at 12:56 PM  [phone]

## 2023-01-26 NOTE — ED NOTES
Report to HEAVEN Coleman. Decline further questions/concerns. Patient placed in transport to floor. Family accompanying patient.         Yue Ponce RN  01/25/23 [id number]

## 2023-01-27 NOTE — PLAN OF CARE
Problem: Discharge Planning  Goal: Discharge to home or other facility with appropriate resources  Outcome: Completed  Flowsheets (Taken 1/26/2023 0820)  Discharge to home or other facility with appropriate resources: Identify barriers to discharge with patient and caregiver     Problem: Safety - Adult  Goal: Free from fall injury  Outcome: Completed     Problem: ABCDS Injury Assessment  Goal: Absence of physical injury  Outcome: Completed     Problem: Skin/Tissue Integrity  Goal: Absence of new skin breakdown  Description: 1. Monitor for areas of redness and/or skin breakdown  2. Assess vascular access sites hourly  3. Every 4-6 hours minimum:  Change oxygen saturation probe site  4. Every 4-6 hours:  If on nasal continuous positive airway pressure, respiratory therapy assess nares and determine need for appliance change or resting period. Outcome: Completed     Problem: Confusion  Goal: Confusion, delirium, dementia, or psychosis is improved or at baseline  Description: INTERVENTIONS:  1. Assess for possible contributors to thought disturbance, including medications, impaired vision or hearing, underlying metabolic abnormalities, dehydration, psychiatric diagnoses, and notify attending LIP  2. [place] high risk fall precautions, as indicated  3. Provide frequent short contacts to provide reality reorientation, refocusing and direction  4. Decrease environmental stimuli, including noise as appropriate  5. Monitor and intervene to maintain adequate nutrition, hydration, elimination, sleep and activity  6. If unable to ensure safety without constant attention obtain sitter and review sitter guidelines with assigned personnel  7.  Initiate Psychosocial CNS and Spiritual Care consult, as indicated  Outcome: Completed     Problem: Pain  Goal: Verbalizes/displays adequate comfort level or baseline comfort level  Outcome: Completed